# Patient Record
Sex: FEMALE | URBAN - METROPOLITAN AREA
[De-identification: names, ages, dates, MRNs, and addresses within clinical notes are randomized per-mention and may not be internally consistent; named-entity substitution may affect disease eponyms.]

---

## 2018-06-20 DIAGNOSIS — R07.89 OTHER CHEST PAIN: ICD-10-CM

## 2018-06-20 DIAGNOSIS — R00.2 HEART PALPITATIONS: Primary | ICD-10-CM

## 2018-08-14 ENCOUNTER — HOSPITAL ENCOUNTER (OUTPATIENT)
Dept: NON INVASIVE DIAGNOSTICS | Age: 43
Discharge: HOME | End: 2018-08-14

## 2018-08-14 DIAGNOSIS — R00.2 HEART PALPITATIONS: ICD-10-CM

## 2018-08-14 DIAGNOSIS — R07.89 OTHER CHEST PAIN: ICD-10-CM

## 2023-03-31 ENCOUNTER — TELEPHONE (OUTPATIENT)
Dept: PRIMARY CARE | Facility: CLINIC | Age: 48
End: 2023-03-31
Payer: COMMERCIAL

## 2023-04-10 PROBLEM — S99.919A ANKLE INJURY: Status: ACTIVE | Noted: 2023-04-10

## 2023-04-10 PROBLEM — K21.9 ESOPHAGEAL REFLUX: Status: ACTIVE | Noted: 2023-04-10

## 2023-04-10 PROBLEM — E55.9 VITAMIN D DEFICIENCY: Status: ACTIVE | Noted: 2023-04-10

## 2023-04-10 PROBLEM — G47.33 OBSTRUCTIVE SLEEP APNEA: Status: ACTIVE | Noted: 2023-04-10

## 2023-04-10 PROBLEM — R05.9 COUGH: Status: ACTIVE | Noted: 2023-04-10

## 2023-04-10 PROBLEM — E66.9 OBESITY, CLASS I, BMI 30-34.9: Status: ACTIVE | Noted: 2023-04-10

## 2023-04-10 PROBLEM — F32.A DEPRESSION: Status: ACTIVE | Noted: 2023-04-10

## 2023-04-10 PROBLEM — R07.89 ATYPICAL CHEST PAIN: Status: ACTIVE | Noted: 2023-04-10

## 2023-04-10 PROBLEM — E78.5 HYPERLIPIDEMIA: Status: ACTIVE | Noted: 2023-04-10

## 2023-04-10 PROBLEM — G47.30 SLEEP DISORDER BREATHING: Status: ACTIVE | Noted: 2023-04-10

## 2023-04-10 PROBLEM — E03.9 HYPOTHYROIDISM: Status: ACTIVE | Noted: 2023-04-10

## 2023-04-10 PROBLEM — E53.8 B12 DEFICIENCY: Status: ACTIVE | Noted: 2023-04-10

## 2023-04-10 PROBLEM — M99.02 SOMATIC DYSFUNCTION OF THORACIC REGION: Status: ACTIVE | Noted: 2023-04-10

## 2023-04-10 PROBLEM — N97.9 FEMALE INFERTILITY: Status: ACTIVE | Noted: 2023-04-10

## 2023-04-10 PROBLEM — O09.00 PREGNANCY WITH HISTORY OF INFERTILITY (HHS-HCC): Status: ACTIVE | Noted: 2023-04-10

## 2023-04-10 PROBLEM — R51.9 HEADACHE: Status: ACTIVE | Noted: 2023-04-10

## 2023-04-10 RX ORDER — LEVOTHYROXINE SODIUM 88 UG/1
1 TABLET ORAL DAILY
COMMUNITY
Start: 2016-06-15 | End: 2023-08-07

## 2023-04-10 RX ORDER — FLUOXETINE 10 MG/1
CAPSULE ORAL
COMMUNITY
Start: 2022-02-03 | End: 2023-04-11 | Stop reason: SDUPTHER

## 2023-04-10 RX ORDER — ATORVASTATIN CALCIUM 10 MG/1
1 TABLET, FILM COATED ORAL NIGHTLY
COMMUNITY
Start: 2021-04-12 | End: 2023-06-06

## 2023-04-10 ASSESSMENT — PROMIS GLOBAL HEALTH SCALE
RATE_SOCIAL_SATISFACTION: FAIR
RATE_QUALITY_OF_LIFE: GOOD
RATE_AVERAGE_FATIGUE: SEVERE
CARRYOUT_PHYSICAL_ACTIVITIES: MOSTLY
RATE_GENERAL_HEALTH: FAIR
EMOTIONAL_PROBLEMS: OFTEN
RATE_MENTAL_HEALTH: FAIR
RATE_AVERAGE_PAIN: 0
RATE_PHYSICAL_HEALTH: FAIR
CARRYOUT_SOCIAL_ACTIVITIES: FAIR

## 2023-04-11 ENCOUNTER — OFFICE VISIT (OUTPATIENT)
Dept: PRIMARY CARE | Facility: CLINIC | Age: 48
End: 2023-04-11
Payer: COMMERCIAL

## 2023-04-11 VITALS
OXYGEN SATURATION: 95 % | RESPIRATION RATE: 18 BRPM | BODY MASS INDEX: 32.12 KG/M2 | WEIGHT: 170 LBS | TEMPERATURE: 98 F | HEART RATE: 85 BPM

## 2023-04-11 DIAGNOSIS — E78.5 HYPERLIPIDEMIA, UNSPECIFIED HYPERLIPIDEMIA TYPE: ICD-10-CM

## 2023-04-11 DIAGNOSIS — E53.8 B12 DEFICIENCY: ICD-10-CM

## 2023-04-11 DIAGNOSIS — R63.5 WEIGHT GAIN: Primary | ICD-10-CM

## 2023-04-11 DIAGNOSIS — G47.33 OBSTRUCTIVE SLEEP APNEA: ICD-10-CM

## 2023-04-11 DIAGNOSIS — E03.9 HYPOTHYROIDISM, UNSPECIFIED TYPE: ICD-10-CM

## 2023-04-11 DIAGNOSIS — E55.9 VITAMIN D DEFICIENCY: ICD-10-CM

## 2023-04-11 DIAGNOSIS — E66.9 OBESITY, CLASS I, BMI 30-34.9: ICD-10-CM

## 2023-04-11 DIAGNOSIS — F32.A DEPRESSION, UNSPECIFIED DEPRESSION TYPE: ICD-10-CM

## 2023-04-11 PROCEDURE — 99396 PREV VISIT EST AGE 40-64: CPT | Performed by: FAMILY MEDICINE

## 2023-04-11 RX ORDER — PANTOPRAZOLE SODIUM 40 MG/1
1 TABLET, DELAYED RELEASE ORAL 2 TIMES DAILY
COMMUNITY
Start: 2023-04-06

## 2023-04-11 RX ORDER — FLUOXETINE 10 MG/1
CAPSULE ORAL
Qty: 270 CAPSULE | Refills: 1 | Status: SHIPPED | OUTPATIENT
Start: 2023-04-11 | End: 2023-11-06

## 2023-04-11 NOTE — PROGRESS NOTES
Subjective   Patient ID: Erin Jeronimo is a 47 y.o. female who presents for Annual Exam (Failed 2 home sleep tests and ants to fu on that, she is very fatigued and snores loudly. /Ed visit a few months ago. Was taken off of lipitor. ).    HPI   Patient comes in today for complete physical exam.  She has failed two home sleep tests and now would like to do one in the sleep lab.  She is very fatigued and snores loudly according to her .    Patient did have a ER visit a few months ago and was taken off of Lipitor.  Her PHQ-9 questionnaire today scored a 12 in which she has described as somewhat difficult.  She is frustrated at her lack of weight loss along with her tiredness and fatigue.        6/27/2022  Patient comes in today to discuss several issues. She is here for a physical exam that she is concerned she may have sleep apnea. She states she has been snoring and grinding her teeth and she tosses and turns at night. She has difficulty with loss of energy, morning headaches, falling asleep during the day and trouble concentrating. She has difficulty getting back to sleep at night when she wakes up and she has been having difficulty losing weight. Her  who also suffers from sleep apnea has observed her with apnea spells as well.    Patient would also like to increase her Prozac dose and see a nutrition specialist. She has lost 6 pounds since she was here in February but she was hoping to lose more.    2/17/2022  Patient is seen today as a telemedicine visit rendered via realtime interactive audio/video [facility] as we are currently in the middle of a coronavirus pandemic worldwide. The patient was informed about the telehealth clinical encounter including benefits to avoiding travel and limitations to the assessment. In person care may be recommended if needed. Telehealth sessions are not being recorded and personal health information is protected.    Patient presents today with upper  respiratory symptoms for over a week. She has had a dry cough, runny nose, her ears are ringing and she reports that she quite frequently gets sinus infection at this time a year. She denies any exposure to COVID. She denies sore throat or body aches or headaches.    2/3/2022  Patient is seen today as a telemedicine visit rendered via realtime interactive audio/video [employer] services as we are currently in the middle of a coronavirus pandemic worldwide. The patient was informed about the telehealth clinical encounter including benefits to avoiding travel and limitations to the assessment. In person care may be recommended if needed. Telehealth sessions are not being recorded and personal health information is protected.    Patient presents today for follow-up and refill of medications. She is not doing so well. She is not sure if it seasonal affective disorder or what it may be but she has no motivation, is sluggish and tired, and has gained weight (around 10 pounds). She is not happy.    4/9/2021  Patient comes in today for checkup and follow-up on her medications. She has not been doing well with her sleeping the past several months. She is also had a number of other issues such as weight gain, dry skin, snoring loudly. She is only sleeping 3 to 4 hours every night. Her  has sleep apnea and uses a CPAP machine but still hears her snoring. It has been a rough year for her 2 children due to Covid and virtual learning. Her kids have handicaps that really have them learning better if they are at school. She recently changed schools for both of them and it was a good move for both of them. One is in the fourth grade and one is in the first grade and she has had a  for both of them and they appear to be about a year behind in their work. Some of her symptoms may be related to depression overall of this.     Review of Systems   All other systems reviewed and are negative.      Objective   Pulse 85   Temp  36.7 °C (98 °F)   Resp 18   Wt 77.1 kg (170 lb)   LMP 04/04/2023 (Exact Date)   SpO2 95%   BMI 32.12 kg/m²     Physical Exam  Vitals reviewed.   Constitutional:       Appearance: She is well-developed.   HENT:      Head: Normocephalic and atraumatic.      Right Ear: Tympanic membrane, ear canal and external ear normal.      Left Ear: Tympanic membrane, ear canal and external ear normal.      Nose: Nose normal.      Mouth/Throat:      Mouth: Mucous membranes are moist.      Pharynx: Oropharynx is clear.   Eyes:      Extraocular Movements: Extraocular movements intact.      Conjunctiva/sclera: Conjunctivae normal.      Pupils: Pupils are equal, round, and reactive to light.   Cardiovascular:      Rate and Rhythm: Normal rate and regular rhythm.      Heart sounds: Normal heart sounds. No murmur heard.  Pulmonary:      Effort: Pulmonary effort is normal.      Breath sounds: Normal breath sounds. No wheezing.   Abdominal:      General: Abdomen is flat. Bowel sounds are normal.      Palpations: Abdomen is soft.   Musculoskeletal:         General: Normal range of motion.   Skin:     General: Skin is warm and dry.   Neurological:      General: No focal deficit present.      Mental Status: She is alert and oriented to person, place, and time. Mental status is at baseline.   Psychiatric:         Mood and Affect: Mood normal.         Behavior: Behavior normal.         Thought Content: Thought content normal.         Judgment: Judgment normal.         Assessment/Plan   Problem List Items Addressed This Visit       B12 deficiency    Relevant Orders    CBC (Completed)    Vitamin B12 (Completed)    Depression    Relevant Medications    FLUoxetine (PROzac) 10 mg capsule    Hyperlipidemia    Relevant Orders    Comprehensive Metabolic Panel (Completed)    Lipid Panel (Completed)    Hypothyroidism    Relevant Orders    TSH with reflex to Free T4 if abnormal (Completed)    Obstructive sleep apnea    Relevant Orders     Comprehensive Metabolic Panel (Completed)    In-Center Sleep Study (Non-Sleep Provider Only)    Obesity, Class I, BMI 30-34.9    Relevant Orders    Comprehensive Metabolic Panel (Completed)    Hemoglobin A1C (Completed)    Vitamin D deficiency    Relevant Orders    Vitamin D, Total (Completed)    Weight gain - Primary    Relevant Orders    Comprehensive Metabolic Panel (Completed)    Hemoglobin A1C (Completed)   In-house sleep study  Will contact patient with lab results when available.  Medication list reconciled.  Thank you for visiting today!      Professional services: Some of this note was completed using Dragon voice technology and sometimes the software misinterprets words. This may include unintended errors with respect to translation of words, typographical errors or grammar errors which may not have been identified prior to finalization of the chart note. Please take this into account when reading the note. Thank you.

## 2023-04-12 ENCOUNTER — LAB (OUTPATIENT)
Dept: LAB | Facility: LAB | Age: 48
End: 2023-04-12
Payer: COMMERCIAL

## 2023-04-12 DIAGNOSIS — E55.9 VITAMIN D DEFICIENCY: ICD-10-CM

## 2023-04-12 DIAGNOSIS — E53.8 B12 DEFICIENCY: ICD-10-CM

## 2023-04-12 DIAGNOSIS — E03.9 HYPOTHYROIDISM, UNSPECIFIED TYPE: ICD-10-CM

## 2023-04-12 DIAGNOSIS — R63.5 WEIGHT GAIN: ICD-10-CM

## 2023-04-12 DIAGNOSIS — E78.5 HYPERLIPIDEMIA, UNSPECIFIED HYPERLIPIDEMIA TYPE: ICD-10-CM

## 2023-04-12 DIAGNOSIS — G47.33 OBSTRUCTIVE SLEEP APNEA: ICD-10-CM

## 2023-04-12 DIAGNOSIS — E66.9 OBESITY, CLASS I, BMI 30-34.9: ICD-10-CM

## 2023-04-12 LAB
ALANINE AMINOTRANSFERASE (SGPT) (U/L) IN SER/PLAS: 19 U/L (ref 7–45)
ALBUMIN (G/DL) IN SER/PLAS: 4.2 G/DL (ref 3.4–5)
ALKALINE PHOSPHATASE (U/L) IN SER/PLAS: 81 U/L (ref 33–110)
ANION GAP IN SER/PLAS: 10 MMOL/L (ref 10–20)
ASPARTATE AMINOTRANSFERASE (SGOT) (U/L) IN SER/PLAS: 18 U/L (ref 9–39)
BILIRUBIN TOTAL (MG/DL) IN SER/PLAS: 0.4 MG/DL (ref 0–1.2)
CALCIDIOL (25 OH VITAMIN D3) (NG/ML) IN SER/PLAS: 55 NG/ML
CALCIUM (MG/DL) IN SER/PLAS: 9.8 MG/DL (ref 8.6–10.3)
CARBON DIOXIDE, TOTAL (MMOL/L) IN SER/PLAS: 28 MMOL/L (ref 21–32)
CHLORIDE (MMOL/L) IN SER/PLAS: 103 MMOL/L (ref 98–107)
CHOLESTEROL (MG/DL) IN SER/PLAS: 211 MG/DL (ref 0–199)
CHOLESTEROL IN HDL (MG/DL) IN SER/PLAS: 42.8 MG/DL
CHOLESTEROL/HDL RATIO: 4.9
COBALAMIN (VITAMIN B12) (PG/ML) IN SER/PLAS: 322 PG/ML (ref 211–911)
CREATININE (MG/DL) IN SER/PLAS: 1.05 MG/DL (ref 0.5–1.05)
ERYTHROCYTE DISTRIBUTION WIDTH (RATIO) BY AUTOMATED COUNT: 13.2 % (ref 11.5–14.5)
ERYTHROCYTE MEAN CORPUSCULAR HEMOGLOBIN CONCENTRATION (G/DL) BY AUTOMATED: 31.4 G/DL (ref 32–36)
ERYTHROCYTE MEAN CORPUSCULAR VOLUME (FL) BY AUTOMATED COUNT: 88 FL (ref 80–100)
ERYTHROCYTES (10*6/UL) IN BLOOD BY AUTOMATED COUNT: 4.54 X10E12/L (ref 4–5.2)
ESTIMATED AVERAGE GLUCOSE FOR HBA1C: 123 MG/DL
GFR FEMALE: 66 ML/MIN/1.73M2
GLUCOSE (MG/DL) IN SER/PLAS: 102 MG/DL (ref 74–99)
HEMATOCRIT (%) IN BLOOD BY AUTOMATED COUNT: 39.8 % (ref 36–46)
HEMOGLOBIN (G/DL) IN BLOOD: 12.5 G/DL (ref 12–16)
HEMOGLOBIN A1C/HEMOGLOBIN TOTAL IN BLOOD: 5.9 %
LDL: 145 MG/DL (ref 0–99)
LEUKOCYTES (10*3/UL) IN BLOOD BY AUTOMATED COUNT: 7.9 X10E9/L (ref 4.4–11.3)
PLATELETS (10*3/UL) IN BLOOD AUTOMATED COUNT: 318 X10E9/L (ref 150–450)
POTASSIUM (MMOL/L) IN SER/PLAS: 4.4 MMOL/L (ref 3.5–5.3)
PROTEIN TOTAL: 6.9 G/DL (ref 6.4–8.2)
SODIUM (MMOL/L) IN SER/PLAS: 137 MMOL/L (ref 136–145)
THYROTROPIN (MIU/L) IN SER/PLAS BY DETECTION LIMIT <= 0.05 MIU/L: 1.98 MIU/L (ref 0.44–3.98)
TRIGLYCERIDE (MG/DL) IN SER/PLAS: 116 MG/DL (ref 0–149)
UREA NITROGEN (MG/DL) IN SER/PLAS: 12 MG/DL (ref 6–23)
VLDL: 23 MG/DL (ref 0–40)

## 2023-04-12 PROCEDURE — 80061 LIPID PANEL: CPT

## 2023-04-12 PROCEDURE — 84443 ASSAY THYROID STIM HORMONE: CPT

## 2023-04-12 PROCEDURE — 80053 COMPREHEN METABOLIC PANEL: CPT

## 2023-04-12 PROCEDURE — 85027 COMPLETE CBC AUTOMATED: CPT

## 2023-04-12 PROCEDURE — 82306 VITAMIN D 25 HYDROXY: CPT

## 2023-04-12 PROCEDURE — 82607 VITAMIN B-12: CPT

## 2023-04-12 PROCEDURE — 83036 HEMOGLOBIN GLYCOSYLATED A1C: CPT

## 2023-04-12 PROCEDURE — 36415 COLL VENOUS BLD VENIPUNCTURE: CPT

## 2023-04-14 ENCOUNTER — TELEPHONE (OUTPATIENT)
Dept: PRIMARY CARE | Facility: CLINIC | Age: 48
End: 2023-04-14
Payer: COMMERCIAL

## 2023-04-14 DIAGNOSIS — E53.8 B12 DEFICIENCY: Primary | ICD-10-CM

## 2023-04-14 RX ORDER — CYANOCOBALAMIN 1000 UG/ML
1000 INJECTION, SOLUTION INTRAMUSCULAR; SUBCUTANEOUS
Qty: 3 ML | Refills: 0 | Status: SHIPPED | OUTPATIENT
Start: 2023-04-14 | End: 2023-08-07

## 2023-04-14 NOTE — TELEPHONE ENCOUNTER
Pt was given results and verbalized understanding. No questions at this time.     SHE HAS AGREED TO DO B12 AT HOME. PLEASE SEND TO PHARMACY ON FILE.

## 2023-04-14 NOTE — TELEPHONE ENCOUNTER
----- Message from Marshall Murillo DO sent at 4/14/2023  7:36 AM EDT -----  Regarding: Labs  Please notify patient of high cholesterol of 211, should not be higher than 199 and high cholesterol/HDL ratio of 4.9.  Any ratio 5.0 or higher is considered high risk for heart disease and stroke, ratio of 3.4 is ideal. Would recommend increasing the Lipitor to 20 mg at bedtime and closely watching cholesterol in the diet and will recheck in August 2023.  If not improved at that time we may need to readjust the medication.    Her vitamin B12 was also low at 322.  It should be above 400, when less than 400 one may have both neurological and/or psychological symptoms. Would recommend B-12 shots monthly for 3 months and recheck in August 2023.  These can be self injected at home or set up in our office as a nurse visit.    Her thyroid level was good continue current medications and recheck in 1 year.    ----- Message -----  From: Lab, Background User  Sent: 4/12/2023  11:58 AM EDT  To: Marshall Murillo DO

## 2023-04-18 ENCOUNTER — APPOINTMENT (OUTPATIENT)
Dept: PRIMARY CARE | Facility: CLINIC | Age: 48
End: 2023-04-18
Payer: COMMERCIAL

## 2023-06-04 DIAGNOSIS — E78.5 HYPERLIPIDEMIA, UNSPECIFIED: ICD-10-CM

## 2023-06-06 RX ORDER — ATORVASTATIN CALCIUM 10 MG/1
TABLET, FILM COATED ORAL
Qty: 90 TABLET | Refills: 1 | Status: SHIPPED | OUTPATIENT
Start: 2023-06-06 | End: 2023-12-13

## 2023-07-07 DIAGNOSIS — G47.33 OBSTRUCTIVE SLEEP APNEA: Primary | ICD-10-CM

## 2023-11-03 DIAGNOSIS — F32.A DEPRESSION, UNSPECIFIED DEPRESSION TYPE: ICD-10-CM

## 2023-11-06 RX ORDER — FLUOXETINE 10 MG/1
CAPSULE ORAL
Qty: 90 CAPSULE | Refills: 0 | Status: SHIPPED | OUTPATIENT
Start: 2023-11-06

## 2023-11-06 NOTE — TELEPHONE ENCOUNTER
Requested Prescriptions     Pending Prescriptions Disp Refills    FLUoxetine (PROzac) 10 mg capsule [Pharmacy Med Name: fluoxetine 10 mg capsule] 90 capsule 0     Sig: TAKE 3 CAPSULES BY MOUTH EVERY MORNING AS DIRECTED

## 2023-12-08 DIAGNOSIS — E53.8 B12 DEFICIENCY: ICD-10-CM

## 2023-12-08 DIAGNOSIS — E03.9 HYPOTHYROIDISM, UNSPECIFIED: ICD-10-CM

## 2023-12-08 DIAGNOSIS — E78.5 HYPERLIPIDEMIA, UNSPECIFIED: ICD-10-CM

## 2023-12-13 RX ORDER — CYANOCOBALAMIN 1000 UG/ML
INJECTION, SOLUTION INTRAMUSCULAR; SUBCUTANEOUS
Qty: 3 ML | Refills: 0 | OUTPATIENT
Start: 2023-12-13

## 2023-12-13 RX ORDER — ATORVASTATIN CALCIUM 10 MG/1
10 TABLET, FILM COATED ORAL NIGHTLY
Qty: 30 TABLET | Refills: 0 | Status: SHIPPED | OUTPATIENT
Start: 2023-12-13 | End: 2024-01-02

## 2023-12-13 RX ORDER — LEVOTHYROXINE SODIUM 88 UG/1
88 TABLET ORAL DAILY
Qty: 30 TABLET | Refills: 0 | Status: SHIPPED | OUTPATIENT
Start: 2023-12-13 | End: 2024-01-02

## 2023-12-13 RX ORDER — SYRINGE, DISPOSABLE, 1 ML
SYRINGE, EMPTY DISPOSABLE MISCELLANEOUS
Qty: 3 EACH | Refills: 0 | OUTPATIENT
Start: 2023-12-13

## 2023-12-13 NOTE — TELEPHONE ENCOUNTER
"Pt was supposed to get lipid and b12 checked in August- fill for b12 not appropriate at this time.         Requested Prescriptions     Pending Prescriptions Disp Refills    atorvastatin (Lipitor) 10 mg tablet [Pharmacy Med Name: atorvastatin 10 mg tablet] 30 tablet 0     Sig: Take 1 tablet (10 mg) by mouth once daily at bedtime.    levothyroxine (Synthroid, Levoxyl) 88 mcg tablet [Pharmacy Med Name: levothyroxine 88 mcg tablet] 30 tablet 0     Sig: Take 1 tablet (88 mcg) by mouth once daily.     Refused Prescriptions Disp Refills    BD Tuberculin Syringe 1 mL 25 gauge x 5/8\" syringe [Pharmacy Med Name: BD Tuberculin Syringe 1 mL 25 gauge x 5/8\"] 3 each 0     Sig: USE AS DIRECTED EVERY 30 days WITH b-12 injection    cyanocobalamin (Vitamin B-12) 1,000 mcg/mL injection [Pharmacy Med Name: cyanocobalamin (vit B-12) 1,000 mcg/mL injection solution] 3 mL 0     Sig: INJECT 1 ml into the shoulder, thigh, or buttocks EVERY 30 days             "

## 2023-12-28 DIAGNOSIS — E53.8 B12 DEFICIENCY: ICD-10-CM

## 2023-12-28 RX ORDER — SYRINGE, DISPOSABLE, 1 ML
SYRINGE, EMPTY DISPOSABLE MISCELLANEOUS
Qty: 3 EACH | Refills: 0 | OUTPATIENT
Start: 2023-12-28

## 2023-12-28 RX ORDER — CYANOCOBALAMIN 1000 UG/ML
INJECTION, SOLUTION INTRAMUSCULAR; SUBCUTANEOUS
Qty: 3 ML | Refills: 0 | OUTPATIENT
Start: 2023-12-28

## 2023-12-28 NOTE — TELEPHONE ENCOUNTER
"PT has not had recheck on BW at this point.    Please refuse.  Requested Prescriptions     Pending Prescriptions Disp Refills    BD Tuberculin Syringe 1 mL 25 gauge x 5/8\" syringe [Pharmacy Med Name: BD Tuberculin Syringe 1 mL 25 gauge x 5/8\"] 3 each 0     Sig: USE AS DIRECTED EVERY 30 days WITH b-12 injection    cyanocobalamin (Vitamin B-12) 1,000 mcg/mL injection [Pharmacy Med Name: cyanocobalamin (vit B-12) 1,000 mcg/mL injection solution] 3 mL 0     Sig: INJECT 1 ml into the shoulder, thigh, or buttocks EVERY 30 days       "

## 2024-01-29 DIAGNOSIS — E53.8 B12 DEFICIENCY: ICD-10-CM

## 2024-01-30 RX ORDER — CYANOCOBALAMIN 1000 UG/ML
INJECTION, SOLUTION INTRAMUSCULAR; SUBCUTANEOUS
Qty: 3 ML | Refills: 0 | OUTPATIENT
Start: 2024-01-30

## 2024-01-30 RX ORDER — SYRINGE, DISPOSABLE, 1 ML
SYRINGE, EMPTY DISPOSABLE MISCELLANEOUS
Qty: 3 EACH | Refills: 0 | OUTPATIENT
Start: 2024-01-30